# Patient Record
Sex: MALE | Race: WHITE
[De-identification: names, ages, dates, MRNs, and addresses within clinical notes are randomized per-mention and may not be internally consistent; named-entity substitution may affect disease eponyms.]

---

## 2017-02-18 NOTE — EDPHY
HPI/HX/ROS/PE/MDM


Narrative: 





Portions of this note were transcribed by an ED scribe.  I personally performed 

the history, physical exam, and medical decision making; and confirm the 

accuracy of the information in the transcribed note.





CHIEF COMPLAINT: Right thumb injury





HPI: The patient is a 38 y/o male complaining of a right thumb injury. He says 

he was sleepwalking around 03:00 this morning per his girlfriend and tripped 

and hit his right thumb on something. His pain did not improve throughout the 

day so he came to the ED for evaluation. No other injuries. 





REVIEW OF SYSTEMS:


Aside from elements discussed in the HPI, a comprehensive 10-point review of 

systems was reviewed and is negative.





PMH: Sleepwalking





SOCIAL HISTORY: Girlfriend at bedside. 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 

turbines.





PHYSICAL EXAM:


General:Patient is alert, in no acute distress.


Skin: Normal color.  No rash.  Warm and dry.


Extremities: Normal appearance.  Full range of motion. Tenderness to right 

distal thumb.


Neuro: Oriented x3.  Normal motor function.  Normal sensory function.


ED Course: 





Study: Right thumb x-ray


Indication: Pain, trauma


Results: Thumb x-ray was obtained.  The results of the study are:


Mildly comminuted and minimally displaced fracture of the distal phalanx of the 

right thumb. 


The study was read by the radiologist, Dr. Aguilar.  I viewed the images myself on 

the PACS system.


MDM: 





Nondisplaced closed distal phalanx injury. The patient will be placed in an 

aluminum finger splint and given referral to Hand surgery.  I see no signs of 

neurovascular compromise.





General


Time Seen by Provider: 02/18/17 18:59


Initial Vital Signs: 


 Initial Vital Signs











Temperature (C)  36.9 C   02/18/17 17:16


 


Heart Rate  73   02/18/17 17:16


 


Respiratory Rate  16   02/18/17 17:16


 


Blood Pressure  134/97 H  02/18/17 17:16


 


O2 Sat (%)  95   02/18/17 17:16








 











O2 Delivery Mode               Room Air














Allergies/Adverse Reactions: 


 





No Known Allergies Allergy (Unverified 02/18/17 17:19)


 








Home Medications: 














 Medication  Instructions  Recorded


 


NK [No Known Home Meds]  02/18/17














Departure





- Departure


Disposition: Home, Routine, Self-Care


Clinical Impression: 


 Fracture of distal phalanx of right thumb





Condition: Good


Instructions:  Thumb Fracture (ED)


Additional Instructions: 


1. Use Tylenol or ibuprofen as directed on the packaging as needed for pain 

over the next 4-7 days.


2. Keep thumb in splint until cleared by hand specialist. This may be a few 

weeks. 


3. Keep hand elevated when possible. 


4. Follow up with Dr. Molina, hand surgeon, in the next 3-5 days. 


5. Return to the ED for severe pain, dramatic increase in redness or swelling, 

weakness or numbness in the thumb. 


Referrals: 


Lischwe,Thomas, MD [Primary Care Provider] - As per Instructions


Joseline Molina MD [Medical Doctor] - As per Instructions


Stand Alone Forms:  Work Limited Duty

## 2018-12-16 ENCOUNTER — HOSPITAL ENCOUNTER (EMERGENCY)
Dept: HOSPITAL 80 - FED | Age: 38
Discharge: HOME | End: 2018-12-16
Payer: COMMERCIAL

## 2018-12-16 VITALS — SYSTOLIC BLOOD PRESSURE: 138 MMHG | DIASTOLIC BLOOD PRESSURE: 87 MMHG
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
## 2018-12-16 DIAGNOSIS — S89.91XA: Primary | ICD-10-CM

## 2018-12-16 NOTE — EDPHY
H & P


Time Seen by Provider: 12/16/18 10:44


HPI/ROS: 





CLINICAL IMPRESSION: 


Right knee sprain 





ASSESSMENT/PLAN:


38-year-old male with no significant reported past medical history presents to 

the emergency department with right knee pain after twisting the knee last 

night.  No obvious swelling or joint effusion noted.  No proximal or distal 

joint injury.  No radiologic evidence to support fracture or underlying 

dislocation.  Negative anterior drawer and Wesley testing.  Patient has a 

knee brace at home.  Rice treatment discussed, orthopedic referral given, 

warning signs return to ED sooner outlined and discharge.  Work note provided 

for today.





DIFFERENTIAL DX:


Differential includes but not limited to acute fracture, ligamentous injury, 

meniscus injury, sprain





ED PROCEDURES:


Patient has a knee brace at home





ED COURSE:


11:20 a.m.:  Preliminary review of x-rays of the knee show no acute tibial 

plateau or proximal fibular fracture.  Patient has a knee brace at home he will 

continue to use.  Orthopedic referral given.





CHIEF COMPLAINT:  Right knee pain





HPI:


38-year-old male presents to the emergency department with complaints of right 

knee pain since yesterday.  Patient reports he was playing with his 10-year-old

, have the child over his shoulder when his right knee gave out.  He believes 

that it went laterally.  He fell to the left side.  He slept with a brace on 

the knee last night but did not apply ice or elevate.  He has not taken any 

medication.  Today he has some pain with movement of the knee and weight-

bearing.  No obvious swelling.  No complaints of hip, ankle or foot pain.  No 

prior orthopedic injury or surgery to this knee.





PAST MEDICAL HISTORY:  None reported





Pertinent Past Surgical History:  None reported





Social History:  Nonsmoker otherwise healthy





REVIEW OF SYSTEMS:


All other systems negative


Constitutional:  No fever, no chills


Musculoskeletal:  No deformity, + joint pain


Skin:  No rashes, color change or open wounds.


Neurological:  No sensory loss or weakness.





PHYSICAL EXAM:


General Appearance:  Alert, oriented, appropriate for age, cooperative, NAD, 

well hydrated, non-toxic appearing, VSS, no hypoxia.


Neurological:  Alert and oriented x 3, normal sensation and strength of 

extremities


Skin: Warm, dry, no rashes, no nodules on palpation.


Musculoskeletal:  No obvious swelling to right knee compared to left.  No color 

change.  Mild reproducible tenderness to the proximal tibia and fibular.  

Negative anterior drawer and Wesley testing.  Distal neurovascular exam 

intact.  No patellar deformity. 





MEDICAL DECISION MAKING:


Patient was seen independently.Secondary supervising physician at time of 

evaluation was Dr. Carroll.


Diagnosis:  Right knee sprain. New, requires workup


Summary: See assessment and plan for summary of ED visit 


Independent visualization of images, tracing, or specimens yes.





Patient Progress stable. 


Smoking Status: Never smoked


Constitutional: 


 Initial Vital Signs











Temperature (C)  36.9 C   12/16/18 10:45


 


Heart Rate  76   12/16/18 10:45


 


Respiratory Rate  16   12/16/18 10:45


 


Blood Pressure  138/87 H  12/16/18 10:45


 


O2 Sat (%)  96   12/16/18 10:45








 











O2 Delivery Mode               Room Air














Allergies/Adverse Reactions: 


 





No Known Allergies Allergy (Unverified 02/18/17 17:19)


 








Home Medications: 














 Medication  Instructions  Recorded


 


NK [No Known Home Meds]  02/18/17














MDM/Departure





- MDM


Imaging: I viewed and interpreted images myself





- Depart


Disposition: Home, Routine, Self-Care


Clinical Impression: 


Knee pain, right


Qualifiers:


 Chronicity: acute Qualified Code(s): M25.561 - Pain in right knee





Condition: Good


Instructions:  Knee Pain (ED)


Additional Instructions: 


DISCHARGE INSTRUCTIONS FROM YOUR DOCTOR 


Thank you for visiting our emergency department today. Please keep in mind that 

discharge from the emergency department does not mean that there is nothing 

wrong - it simply means that we have not identified an emergency condition that 

requires further evaluation or treatment in the hospital. You should always 

plan to follow up with primary care for re-evaluation of your condition in the 

next 2-3 days. If you have been referred to a specialist, please call as soon 

as possible (today or tomorrow) to schedule your follow up appointment at the 

appropriate time. 





 Preliminary review of the x-rays of your knee show no evidence of tibial 

plateau or proximal fibular fracture.  You may have sustained a meniscus injury 

or ligamentous injury.  We do not routinely perform emergent MRIs for 

orthopedic injuries in the ER.  We recommend that you rest at home, apply ice 

intermittently 20 min on, 20 min off, elevate the leg, and use the knee brace 

you have as needed.  An orthopedic referral was given.  Please call them for a 

follow-up if her symptoms persist.  Return to the emergency department for 

worsening pain, loss of sensation to the leg or foot, increased swelling, 

fevers or any other concern.





People present with illnesses and injuries in different ways, and it is always 

possible that we have missed something. You may always return for re-evaluation 

if symptoms worsen or if they are not improving or if you develop new/different 

symptoms. 


Again, thank you for choosing our emergency department. We hope that you feel 

better.


Referrals: 


Lischwe,Thomas, MD [Primary Care Provider] - As per Instructions


Garcia Lopez MD [Medical Doctor] - 2-3 days, if not improved